# Patient Record
Sex: FEMALE | Race: WHITE
[De-identification: names, ages, dates, MRNs, and addresses within clinical notes are randomized per-mention and may not be internally consistent; named-entity substitution may affect disease eponyms.]

---

## 2019-11-27 ENCOUNTER — HOSPITAL ENCOUNTER (OUTPATIENT)
Dept: HOSPITAL 47 - LABPAT | Age: 69
Discharge: HOME | End: 2019-11-27
Attending: ORTHOPAEDIC SURGERY
Payer: MEDICARE

## 2019-11-27 DIAGNOSIS — Z79.01: ICD-10-CM

## 2019-11-27 DIAGNOSIS — Z01.812: Primary | ICD-10-CM

## 2019-11-27 LAB
ALBUMIN SERPL-MCNC: 3.8 G/DL (ref 3.5–5)
ALP SERPL-CCNC: 82 U/L (ref 38–126)
ALT SERPL-CCNC: 26 U/L (ref 9–52)
ANION GAP SERPL CALC-SCNC: 7 MMOL/L
APTT BLD: 26.6 SEC (ref 22–30)
AST SERPL-CCNC: 29 U/L (ref 14–36)
BUN SERPL-SCNC: 11 MG/DL (ref 7–17)
CALCIUM SPEC-MCNC: 9.9 MG/DL (ref 8.4–10.2)
CHLORIDE SERPL-SCNC: 105 MMOL/L (ref 98–107)
CO2 SERPL-SCNC: 25 MMOL/L (ref 22–30)
ERYTHROCYTE [DISTWIDTH] IN BLOOD BY AUTOMATED COUNT: 4.25 M/UL (ref 3.8–5.4)
ERYTHROCYTE [DISTWIDTH] IN BLOOD: 11.9 % (ref 11.5–15.5)
GLUCOSE SERPL-MCNC: 119 MG/DL (ref 74–99)
HCT VFR BLD AUTO: 38.5 % (ref 34–46)
HGB BLD-MCNC: 13.3 GM/DL (ref 11.4–16)
INR PPP: 1 (ref ?–1.2)
MCH RBC QN AUTO: 31.3 PG (ref 25–35)
MCHC RBC AUTO-ENTMCNC: 34.6 G/DL (ref 31–37)
MCV RBC AUTO: 90.4 FL (ref 80–100)
PH UR: 5.5 [PH] (ref 5–8)
PLATELET # BLD AUTO: 241 K/UL (ref 150–450)
POTASSIUM SERPL-SCNC: 4.3 MMOL/L (ref 3.5–5.1)
PROT SERPL-MCNC: 6.8 G/DL (ref 6.3–8.2)
PT BLD: 10.2 SEC (ref 9–12)
SODIUM SERPL-SCNC: 137 MMOL/L (ref 137–145)
SP GR UR: 1.01 (ref 1–1.03)
UROBILINOGEN UR QL STRIP: <2 MG/DL (ref ?–2)
WBC # BLD AUTO: 5.3 K/UL (ref 3.8–10.6)

## 2019-11-27 PROCEDURE — 85730 THROMBOPLASTIN TIME PARTIAL: CPT

## 2019-11-27 PROCEDURE — 87070 CULTURE OTHR SPECIMN AEROBIC: CPT

## 2019-11-27 PROCEDURE — 36415 COLL VENOUS BLD VENIPUNCTURE: CPT

## 2019-11-27 PROCEDURE — 85027 COMPLETE CBC AUTOMATED: CPT

## 2019-11-27 PROCEDURE — 80053 COMPREHEN METABOLIC PANEL: CPT

## 2019-11-27 PROCEDURE — 85610 PROTHROMBIN TIME: CPT

## 2019-11-27 PROCEDURE — 81003 URINALYSIS AUTO W/O SCOPE: CPT

## 2019-12-09 ENCOUNTER — HOSPITAL ENCOUNTER (OUTPATIENT)
Dept: HOSPITAL 47 - LABPAT | Age: 69
Discharge: HOME | End: 2019-12-09
Attending: ORTHOPAEDIC SURGERY
Payer: MEDICARE

## 2019-12-09 DIAGNOSIS — Z53.9: Primary | ICD-10-CM

## 2019-12-12 ENCOUNTER — HOSPITAL ENCOUNTER (INPATIENT)
Dept: HOSPITAL 47 - 2ORMAIN | Age: 69
LOS: 1 days | Discharge: HOME HEALTH | DRG: 470 | End: 2019-12-13
Attending: ORTHOPAEDIC SURGERY | Admitting: ORTHOPAEDIC SURGERY
Payer: MEDICARE

## 2019-12-12 VITALS — BODY MASS INDEX: 30.7 KG/M2

## 2019-12-12 DIAGNOSIS — E78.5: ICD-10-CM

## 2019-12-12 DIAGNOSIS — Z96.611: ICD-10-CM

## 2019-12-12 DIAGNOSIS — G47.9: ICD-10-CM

## 2019-12-12 DIAGNOSIS — M25.552: ICD-10-CM

## 2019-12-12 DIAGNOSIS — Z79.01: ICD-10-CM

## 2019-12-12 DIAGNOSIS — K21.9: ICD-10-CM

## 2019-12-12 DIAGNOSIS — I10: ICD-10-CM

## 2019-12-12 DIAGNOSIS — M25.542: ICD-10-CM

## 2019-12-12 DIAGNOSIS — Z88.5: ICD-10-CM

## 2019-12-12 DIAGNOSIS — Z79.890: ICD-10-CM

## 2019-12-12 DIAGNOSIS — M25.562: ICD-10-CM

## 2019-12-12 DIAGNOSIS — Z82.49: ICD-10-CM

## 2019-12-12 DIAGNOSIS — I48.0: ICD-10-CM

## 2019-12-12 DIAGNOSIS — R01.1: ICD-10-CM

## 2019-12-12 DIAGNOSIS — E03.9: ICD-10-CM

## 2019-12-12 DIAGNOSIS — M25.541: ICD-10-CM

## 2019-12-12 DIAGNOSIS — Z79.84: ICD-10-CM

## 2019-12-12 DIAGNOSIS — Z98.84: ICD-10-CM

## 2019-12-12 DIAGNOSIS — Z83.3: ICD-10-CM

## 2019-12-12 DIAGNOSIS — M25.561: ICD-10-CM

## 2019-12-12 DIAGNOSIS — M25.512: ICD-10-CM

## 2019-12-12 DIAGNOSIS — Z96.651: ICD-10-CM

## 2019-12-12 DIAGNOSIS — J45.909: ICD-10-CM

## 2019-12-12 DIAGNOSIS — M16.11: Primary | ICD-10-CM

## 2019-12-12 DIAGNOSIS — Z79.899: ICD-10-CM

## 2019-12-12 DIAGNOSIS — M25.511: ICD-10-CM

## 2019-12-12 DIAGNOSIS — Z87.11: ICD-10-CM

## 2019-12-12 DIAGNOSIS — E11.9: ICD-10-CM

## 2019-12-12 LAB
GLUCOSE BLD-MCNC: 171 MG/DL (ref 75–99)
GLUCOSE BLD-MCNC: 94 MG/DL (ref 75–99)

## 2019-12-12 PROCEDURE — 86901 BLOOD TYPING SEROLOGIC RH(D): CPT

## 2019-12-12 PROCEDURE — 88300 SURGICAL PATH GROSS: CPT

## 2019-12-12 PROCEDURE — 36415 COLL VENOUS BLD VENIPUNCTURE: CPT

## 2019-12-12 PROCEDURE — 0SR906A REPLACEMENT OF RIGHT HIP JOINT WITH OXIDIZED ZIRCONIUM ON POLYETHYLENE SYNTHETIC SUBSTITUTE, UNCEMENTED, OPEN APPROACH: ICD-10-PCS

## 2019-12-12 PROCEDURE — 85025 COMPLETE CBC W/AUTO DIFF WBC: CPT

## 2019-12-12 PROCEDURE — 86891 AUTOLOGOUS BLOOD OP SALVAGE: CPT

## 2019-12-12 PROCEDURE — 86900 BLOOD TYPING SEROLOGIC ABO: CPT

## 2019-12-12 PROCEDURE — 86850 RBC ANTIBODY SCREEN: CPT

## 2019-12-12 PROCEDURE — 73501 X-RAY EXAM HIP UNI 1 VIEW: CPT

## 2019-12-12 RX ADMIN — SODIUM CHLORIDE ONE MG: 9 INJECTION, SOLUTION INTRAVENOUS at 08:12

## 2019-12-12 RX ADMIN — SODIUM CHLORIDE ONE MG: 9 INJECTION, SOLUTION INTRAVENOUS at 07:34

## 2019-12-12 RX ADMIN — PROMETHAZINE HYDROCHLORIDE PRN MG: 25 TABLET ORAL at 16:02

## 2019-12-12 RX ADMIN — HYDROCODONE BITARTRATE AND ACETAMINOPHEN PRN EACH: 5; 325 TABLET ORAL at 21:17

## 2019-12-12 RX ADMIN — PROMETHAZINE HYDROCHLORIDE PRN MG: 25 TABLET ORAL at 21:17

## 2019-12-12 RX ADMIN — CEFAZOLIN SCH: 330 INJECTION, POWDER, FOR SOLUTION INTRAMUSCULAR; INTRAVENOUS at 13:44

## 2019-12-12 RX ADMIN — HYDROCODONE BITARTRATE AND ACETAMINOPHEN PRN EACH: 5; 325 TABLET ORAL at 16:02

## 2019-12-12 RX ADMIN — CEFAZOLIN SCH: 330 INJECTION, POWDER, FOR SOLUTION INTRAMUSCULAR; INTRAVENOUS at 21:39

## 2019-12-12 RX ADMIN — POTASSIUM CHLORIDE SCH MLS: 14.9 INJECTION, SOLUTION INTRAVENOUS at 06:20

## 2019-12-12 NOTE — XR
EXAMINATION TYPE: XR Hip Limited RT

 

DATE OF EXAM: 12/12/2019

 

COMPARISON: NONE

 

HISTORY: Postop

 

TECHNIQUE: One view submitted.

 

FINDINGS:

There is postsurgical change in near anatomic alignment.  There is soft tissue edema and emphysema. 

 

IMPRESSION:

1. Postoperative change.  Appears in near-anatomic alignment.

## 2019-12-12 NOTE — XR
Right hip

 

HISTORY: Status post right hip arthroplasty

 

Single frontal view of the right hip

 

Patient is status post right hip arthroplasty. There is anatomic alignment. Lucency present in the so
ft tissues compatible with postop state

 

IMPRESSION: Orthopedic follow-up

## 2019-12-12 NOTE — P.OP
Date of Procedure: 12/12/19


Preoperative Diagnosis: 


Severe osteoarthritis right hip


Postoperative Diagnosis: 


Severe osteoarthritis right hip


Procedure(s) Performed: 


Right total hip arthroplasty with a direct anterior approach


Implants: 


Smith and nephew Polarstem size 2 standard


Smith & Nephew R3, 3 hole acetabular shell, 48 mm


Smith & Nephew reflection 6.5 mm cancellus screw, 20 mm 2


Smith & Nephew R3, XLPE 20 acetabular liner


Smith & Nephew Oxinium femoral head 32 m, +0


All components were press-fit.


The articulation is Oxinium on polyethylene.


Anesthesia: spinal


Surgeon: Rodney Chamberlain


Assistant #1: Maryam Lema


Estimated Blood Loss (ml): 100


Pathology: other (Femoral head)


Condition: stable


Disposition: PACU


Indications for Procedure: 


After failure of conservative treatment we discussed the surgical and 

nonsurgical treatment options at length.  Patient wishes to proceed with a total

hip arthroplasty with a direct anterior approach.  Complications specific to 

this procedure were discussed at length, including but not limited to infection,

leg length discrepancy, dislocation, and nerve injury.  Patient is aware of all 

these complications and informed consent was obtained


Operative Findings: 


The operative findings are consistent with severe osteoarthritis of the right 

hip


Description of Procedure: 


Patient was seen and evaluated in the preoperative area, consent was reviewed, 

and the surgical site was marked with a skin marker.  Patient was then brought 

to the operating room and given prophylactic antibiotics intravenously.  1 g of 

Tranexamic acid was also given.  A spinal anesthetic was administered by the 

anesthesia department.   The patient was then placed on the Gibbon table with the 

bony prominences well-padded.  The hip area was then prepped and draped in usual

sterile fashion.





A universal timeout was then performed, which confirmed the patient's name, 

surgical site, ALLERGIES, and procedure being performed.  Next the incision site

was located at 1 cm distal and 1 cm lateral to the anterior superior iliac 

spine.  The skin and subcutaneous tissues were sharply incised.  Incision was 

carefully dissected down to the fascia overlying the tensor fascia ricci muscle. 

This fascia was then incised in line with the incision.  Next, using blunt 

finger dissection, the tensor fascia ricci muscle was dissected off its investing

fascia.  The muscle was then carefully retracted laterally with a cobra 

retractor over the lateral neck of the femur.  Next, the circumflex vessels were

identified and cauterized using the AquaMantis device.  The anterior hip capsule

was then exposed.  The capsule was then opened and an inverted T fashion.  Cobra

retractors were then placed intracapsularly.  The proximal femur was then 

visualized.





The femoral neck was then osteotomized appropriate level above the lesser 

trochanter.  Small amount of traction was placed with the Gibbon table.  A small 

wedge of bone was then removed from the remaining femoral head.  Next, using a 

corkscrew femoral head was easily removed from the acetabulum.  On gross visual 

inspection, the femoral head had complete loss of articular cartilage in 

multiple periarticular osteophytes.  Attention was then turned to the 

acetabulum.





the acetabulum was exposed and any remaining labrum was excised.  Sequential 

reaming of the acetabulum was performed using fluoroscopic guidance.  When the 

appropriate size was reached, a trial was then placed.  The position and fit of 

the trial was checked with fluoroscopy.  The trial was then removed.  Then, 

using fluoroscopic guidance, the final implant was impacted at 20 of 

anteversion and 40 of abduction, and fully seated in the acetabulum.  2 screws 

were then placed in the acetabulum.  Again fluoroscopy was used to check 

position of the screws.  Next, the liner was then impacted, with a 20 elevated 

liner located in the anterior superior quadrant.  Component locking was 

confirmed.





Attention was then directed to the femur.  With the aid of the Gibbon table, the 

femur was externally rotated to approximately 130, extended, and abducted under

the opposite leg.  A side hook was then placed under the proximal femur, and the

side hook elevator was used to elevate the proximal femur.  Retractors were then

placed.  A capsular release was performed, as well as a release of the conjoined

tendon, which afforded excellent visualization of the proximal femur.  Next, a 

box osteotome was used to lateralize the proximal femur.  A  was then

used to locate the femoral canal.  Sequential broaching was then performed with 

appropriate size which afforded excellent fixation in the proximal femur.  A 

trial was then placed with appropriate head and neck, and the hip was gently 

reduced with the aid of the Gibbon table.  Fluoroscopy was then used to check 

position of the components, as well as to ensure equal leg lengths.  The hip was

then gently dislocated and the trials were then removed.  Final implants were 

then impacted and the hip was again reduced.  Final fluoroscopic x-rays 

confirmed that the components were in anatomic position, as well as equal leg 

lengths.  The hip was also taken through range of motion, and found to be 

stable.





The hip was then copiously irrigated with antibiotic solution with pulsatile 

lavage.  The hip was then irrigated with Irrisept solution.  The soft tissues 

were then injected with a ropivacaine solution, which consisted of 246.25 mg of 

ropivacaine, 0.5 mg of epinephrine, 30 mg of Toradol, 80 g of clonidine, and 

48.45 mL of sterile water, for a total of 100 mL of fluid injected.  A second 

dose of 1 g of Tranexamic acid was also given.





the fascia was then closed with 2-0 strata fix suture.  The subcutaneous tissue 

was closed with 3-0 Vicryl.  The subcuticular tissue was closed with 3-0 strata 

fix suture.  The skin was then closed with Dermabond glue and a sterile silver 

dressing.  The patient was then transferred to the recovery room in stable 

condition.





The assistant  AYLA Robles was required due to the complexity of surgery, 

and the need for skilled surgical assistant for positioning, draping, exposure, 

retraction, and closure of the wound.
[No studies available for review at this time.] : No studies available for review at this time.

## 2019-12-12 NOTE — FL
EXAMINATION TYPE: FL guidance operating room

 

DATE OF EXAM: 12/12/2019

 

HISTORY: Flouroscopy  time

 

40 seconds of fluoroscopy provided. 

 

IMPRESSION:

1. Fluoroscopy time.

## 2019-12-13 VITALS — DIASTOLIC BLOOD PRESSURE: 64 MMHG | TEMPERATURE: 97.8 F | HEART RATE: 60 BPM | SYSTOLIC BLOOD PRESSURE: 103 MMHG

## 2019-12-13 VITALS — RESPIRATION RATE: 16 BRPM

## 2019-12-13 LAB
BASOPHILS # BLD AUTO: 0 K/UL (ref 0–0.2)
BASOPHILS NFR BLD AUTO: 0 %
EOSINOPHIL # BLD AUTO: 0.1 K/UL (ref 0–0.7)
EOSINOPHIL NFR BLD AUTO: 1 %
ERYTHROCYTE [DISTWIDTH] IN BLOOD BY AUTOMATED COUNT: 3.61 M/UL (ref 3.8–5.4)
ERYTHROCYTE [DISTWIDTH] IN BLOOD: 12.2 % (ref 11.5–15.5)
GLUCOSE BLD-MCNC: 100 MG/DL (ref 75–99)
GLUCOSE BLD-MCNC: 126 MG/DL (ref 75–99)
HCT VFR BLD AUTO: 32.9 % (ref 34–46)
HGB BLD-MCNC: 11.2 GM/DL (ref 11.4–16)
LYMPHOCYTES # SPEC AUTO: 1.7 K/UL (ref 1–4.8)
LYMPHOCYTES NFR SPEC AUTO: 27 %
MCH RBC QN AUTO: 30.9 PG (ref 25–35)
MCHC RBC AUTO-ENTMCNC: 33.9 G/DL (ref 31–37)
MCV RBC AUTO: 91 FL (ref 80–100)
MONOCYTES # BLD AUTO: 0.4 K/UL (ref 0–1)
MONOCYTES NFR BLD AUTO: 7 %
NEUTROPHILS # BLD AUTO: 4.1 K/UL (ref 1.3–7.7)
NEUTROPHILS NFR BLD AUTO: 64 %
PLATELET # BLD AUTO: 197 K/UL (ref 150–450)
WBC # BLD AUTO: 6.4 K/UL (ref 3.8–10.6)

## 2019-12-13 RX ADMIN — HYDROCODONE BITARTRATE AND ACETAMINOPHEN PRN EACH: 5; 325 TABLET ORAL at 10:39

## 2019-12-13 RX ADMIN — HYDROCODONE BITARTRATE AND ACETAMINOPHEN PRN EACH: 5; 325 TABLET ORAL at 04:05

## 2019-12-13 RX ADMIN — PROMETHAZINE HYDROCHLORIDE PRN MG: 25 TABLET ORAL at 11:07

## 2019-12-13 RX ADMIN — CEFAZOLIN SCH: 330 INJECTION, POWDER, FOR SOLUTION INTRAMUSCULAR; INTRAVENOUS at 11:10

## 2019-12-13 RX ADMIN — POTASSIUM CHLORIDE SCH: 14.9 INJECTION, SOLUTION INTRAVENOUS at 05:22

## 2019-12-13 NOTE — P.DS
Providers


Date of admission: 


12/12/19 05:34





Expected date of discharge: 12/13/19


Attending physician: 


Rodney Chamberlain





Consults: 





                                        





12/12/19 07:00


Consult Physician Routine 


   Consulting Provider: Keri Rodriguez


   Consult Reason/Comments: medical management and anticoagulation


   Do you want consulting provider notified?: Yes











Primary care physician: 


Karl Retana








- Discharge Diagnosis(es)


(1) Osteoarthritis of right hip


Current Visit: Yes   Status: Acute   





(2) S/P total hip arthroplasty


Current Visit: Yes   Status: Acute   


Hospital Course: 


This is a 69-year-old female with known history of degenerative arthritis of the

right hip.  The patient presents for evaluation.  After discussion and 

consideration patient elects to proceed with total hip arthroplasty.  The 

patient is seen preoperatively by Dr. Chamberlain and medically cleared for surgery

by their primary care physician.





Patient is admitted to [facility] on 12/12/2019 for total hip 

arthroplasty.  The procedures performed without complication or sequelae.  The 

patient is doing well postoperatively.  Labs and vital signs are stable on day 

of discharge.





On day of discharge patient's hip incision is healing well.  There is minimal 

erythema.  There is no drainage noted at this time.  There is minimal soft 

tissue swelling to the hip and thigh.  Patient has full foot and ankle motion 

without difficulty or pain.  Calf is soft and nontender to palpation.  

Neurovascular status to the right lower extremity is intact.  Patient is 

discharged home in good condition. Opioid start talking form is reviewed and 

signed at patient bedside. Patient takes Xarelto routinely and will resume her 

normal dosing for postoperative anticoagulation. Please see med rec for accurate

list of home medications.








Plan - Discharge Summary


Discharge Rx Participant: Yes


New Discharge Prescriptions: 


New


   HYDROcodone/APAP 5-325MG [Norco 5-325] 1 - 2 tab PO Q6HR PRN #56 tab


     PRN Reason: Pain


   Promethazine [Phenergan] 12.5 mg PO Q6HR #28 tablet


   Sennosides [Senokot] 2 tab PO DAILY PRN #60 tablet


     PRN Reason: Constipation





No Action


   Zolpidem [Ambien] 10 mg PO HS PRN


     PRN Reason: Insomnia


   Montelukast [Singulair] 10 mg PO HS


   Metoprolol Succinate (ER) [Toprol Xl] 25 mg PO HS


   metFORMIN HCL ER [Glucophage Xr] 500 mg PO HS


   Rivaroxaban [Xarelto] 20 mg PO HS


   Esomeprazole Magnesium [NexIUM] 40 mg PO BID


   Celecoxib [CeleBREX] 200 mg PO DAILY


   Albuterol Nebulized [Ventolin Nebulized] 2.5 mg INHALATION Q6H PRN


     PRN Reason: Dyspnea


   Albuterol Inhaler [Ventolin Hfa Inhaler] 1 - 2 puff INHALATION RT-Q6H PRN


     PRN Reason: Dyspnea


   Losartan [Cozaar] 50 mg PO DAILY


   Levothyroxine Sodium [Synthroid] 112 mcg PO DAILY


   Rosuvastatin [Crestor] 10 mg PO HS


   Dexlansoprazole [Dexilant] 60 mg PO DAILY


Discharge Medication List





Albuterol Inhaler [Ventolin Hfa Inhaler] 1 - 2 puff INHALATION RT-Q6H PRN 

12/10/19 [History]


Albuterol Nebulized [Ventolin Nebulized] 2.5 mg INHALATION Q6H PRN 12/10/19 

[History]


Celecoxib [CeleBREX] 200 mg PO DAILY 12/10/19 [History]


Dexlansoprazole [Dexilant] 60 mg PO DAILY 12/10/19 [History]


Esomeprazole Magnesium [NexIUM] 40 mg PO BID 12/10/19 [History]


Levothyroxine Sodium [Synthroid] 112 mcg PO DAILY 12/10/19 [History]


Losartan [Cozaar] 50 mg PO DAILY 12/10/19 [History]


Metoprolol Succinate (ER) [Toprol Xl] 25 mg PO HS 12/10/19 [History]


Montelukast [Singulair] 10 mg PO HS 12/10/19 [History]


Rivaroxaban [Xarelto] 20 mg PO HS 12/10/19 [History]


Rosuvastatin [Crestor] 10 mg PO HS 12/10/19 [History]


Zolpidem [Ambien] 10 mg PO HS PRN 12/10/19 [History]


metFORMIN HCL ER [Glucophage Xr] 500 mg PO HS 12/10/19 [History]


HYDROcodone/APAP 5-325MG [Norco 5-325] 1 - 2 tab PO Q6HR PRN #56 tab 12/13/19 

[Rx]


Promethazine [Phenergan] 12.5 mg PO Q6HR #28 tablet 12/13/19 [Rx]


Sennosides [Senokot] 2 tab PO DAILY PRN #60 tablet 12/13/19 [Rx]








Follow up Appointment(s)/Referral(s): 


Rodney Chamberlain DO [Doctor of Osteopathic Medicine] - 2 Weeks


Ambulatory/Diagnostic Orders: 


Walker w/ Wheels [DME.AMB1] Time Frame: 3 Months, Location: None Selected


Activity/Diet/Wound Care/Special Instructions: 


Weightbearing as tolerated with walker.


Leave dressing intact.  Dressing may be removed by home care nurse or by patient

 in 10 days.


May shower with dressing on.


Continue Xarelto for postoperative anticoagulation.


Recommend use of compression stockings daily for at least 2 weeks during the day

 to help prevent swelling and blood clots. May remove at night before sleeping. 


Please follow-up with [facility] in 2 weeks and call with any 

questions or concerns, [phone].


Discharge Disposition: HOME WITH HOME HEALTH SERVICES

## 2022-05-26 ENCOUNTER — HOSPITAL ENCOUNTER (OUTPATIENT)
Dept: HOSPITAL 47 - LABPAT | Age: 72
Discharge: HOME | End: 2022-05-26
Attending: ORTHOPAEDIC SURGERY
Payer: MEDICARE

## 2022-05-26 DIAGNOSIS — Z01.812: Primary | ICD-10-CM

## 2022-05-26 LAB
ALBUMIN SERPL-MCNC: 4.1 G/DL (ref 3.8–4.9)
ALBUMIN/GLOB SERPL: 1.32 G/DL (ref 1.6–3.17)
ALP SERPL-CCNC: 95 U/L (ref 41–126)
ALT SERPL-CCNC: 34 U/L (ref 8–44)
ANION GAP SERPL CALC-SCNC: 9.8 MMOL/L (ref 10–18)
APTT BLD: 28.6 SEC (ref 22–30)
AST SERPL-CCNC: 30 U/L (ref 13–35)
BUN SERPL-SCNC: 12.6 MG/DL (ref 9–27)
BUN/CREAT SERPL: 15.75 RATIO (ref 12–20)
CALCIUM SPEC-MCNC: 10.1 MG/DL (ref 8.7–10.3)
CHLORIDE SERPL-SCNC: 104 MMOL/L (ref 96–109)
CO2 SERPL-SCNC: 22.2 MMOL/L (ref 20–27.5)
ERYTHROCYTE [DISTWIDTH] IN BLOOD BY AUTOMATED COUNT: 4.47 X 10*6/UL (ref 4.1–5.2)
ERYTHROCYTE [DISTWIDTH] IN BLOOD: 12.3 % (ref 11.5–14.5)
GLOBULIN SER CALC-MCNC: 3.1 G/DL (ref 1.6–3.3)
GLUCOSE SERPL-MCNC: 108 MG/DL (ref 70–110)
HCT VFR BLD AUTO: 41.4 % (ref 37.2–46.3)
HGB BLD-MCNC: 13.4 G/DL (ref 12–15)
INR PPP: 1.1 (ref ?–1.2)
MCH RBC QN AUTO: 30 PG (ref 27–32)
MCHC RBC AUTO-ENTMCNC: 32.4 G/DL (ref 32–37)
MCV RBC AUTO: 92.6 FL (ref 80–97)
NRBC BLD AUTO-RTO: 0 /100 WBCS (ref 0–0)
PLATELET # BLD AUTO: 226 X 10*3/UL (ref 140–440)
POTASSIUM SERPL-SCNC: 4.1 MMOL/L (ref 3.5–5.5)
PROT SERPL-MCNC: 7.2 G/DL (ref 6.2–8.2)
PT BLD: 11.5 SEC (ref 9–12)
SODIUM SERPL-SCNC: 136 MMOL/L (ref 135–145)
WBC # BLD AUTO: 10 X 10*3/UL (ref 4.5–10)

## 2022-05-26 PROCEDURE — 87070 CULTURE OTHR SPECIMN AEROBIC: CPT

## 2022-05-26 PROCEDURE — 85610 PROTHROMBIN TIME: CPT

## 2022-05-26 PROCEDURE — 80053 COMPREHEN METABOLIC PANEL: CPT

## 2022-05-26 PROCEDURE — 81001 URINALYSIS AUTO W/SCOPE: CPT

## 2022-05-26 PROCEDURE — 85027 COMPLETE CBC AUTOMATED: CPT

## 2022-05-26 PROCEDURE — 36415 COLL VENOUS BLD VENIPUNCTURE: CPT

## 2022-05-26 PROCEDURE — 85730 THROMBOPLASTIN TIME PARTIAL: CPT

## 2022-05-26 PROCEDURE — 93005 ELECTROCARDIOGRAM TRACING: CPT

## 2022-05-27 LAB
PH UR: 5 [PH] (ref 5–8)
SP GR UR: >1.035 (ref 1–1.03)
UROBILINOGEN UR QL: 1

## 2022-06-07 ENCOUNTER — HOSPITAL ENCOUNTER (OUTPATIENT)
Dept: HOSPITAL 47 - OR | Age: 72
LOS: 1 days | End: 2022-06-08
Attending: ORTHOPAEDIC SURGERY
Payer: COMMERCIAL

## 2022-06-07 DIAGNOSIS — Z82.49: ICD-10-CM

## 2022-06-07 DIAGNOSIS — Z98.890: ICD-10-CM

## 2022-06-07 DIAGNOSIS — I10: ICD-10-CM

## 2022-06-07 DIAGNOSIS — Z79.890: ICD-10-CM

## 2022-06-07 DIAGNOSIS — I48.0: ICD-10-CM

## 2022-06-07 DIAGNOSIS — E11.9: ICD-10-CM

## 2022-06-07 DIAGNOSIS — Z79.899: ICD-10-CM

## 2022-06-07 DIAGNOSIS — J45.909: ICD-10-CM

## 2022-06-07 DIAGNOSIS — Z83.3: ICD-10-CM

## 2022-06-07 DIAGNOSIS — Z88.5: ICD-10-CM

## 2022-06-07 DIAGNOSIS — Z96.653: ICD-10-CM

## 2022-06-07 DIAGNOSIS — Z79.01: ICD-10-CM

## 2022-06-07 DIAGNOSIS — K21.9: ICD-10-CM

## 2022-06-07 DIAGNOSIS — M16.12: Primary | ICD-10-CM

## 2022-06-07 DIAGNOSIS — E78.5: ICD-10-CM

## 2022-06-07 DIAGNOSIS — E03.9: ICD-10-CM

## 2022-06-07 DIAGNOSIS — Z98.84: ICD-10-CM

## 2022-06-07 DIAGNOSIS — Z80.9: ICD-10-CM

## 2022-06-07 DIAGNOSIS — K44.9: ICD-10-CM

## 2022-06-07 DIAGNOSIS — Z98.891: ICD-10-CM

## 2022-06-07 DIAGNOSIS — Z87.11: ICD-10-CM

## 2022-06-07 LAB
ANION GAP SERPL CALC-SCNC: 9 MMOL/L
BUN SERPL-SCNC: 10 MG/DL (ref 7–17)
CALCIUM SPEC-MCNC: 9.8 MG/DL (ref 8.4–10.2)
CELLS COUNTED: 100
CHLORIDE SERPL-SCNC: 107 MMOL/L (ref 98–107)
CO2 SERPL-SCNC: 24 MMOL/L (ref 22–30)
ERYTHROCYTE [DISTWIDTH] IN BLOOD BY AUTOMATED COUNT: 4.44 M/UL (ref 3.8–5.4)
ERYTHROCYTE [DISTWIDTH] IN BLOOD: 12.1 % (ref 11.5–15.5)
GLUCOSE BLD-MCNC: 104 MG/DL (ref 75–99)
GLUCOSE BLD-MCNC: 132 MG/DL (ref 75–99)
GLUCOSE BLD-MCNC: 184 MG/DL (ref 75–99)
GLUCOSE SERPL-MCNC: 158 MG/DL (ref 74–99)
HCT VFR BLD AUTO: 41.3 % (ref 34–46)
HGB BLD-MCNC: 13.2 GM/DL (ref 11.4–16)
LYMPHOCYTES # BLD MANUAL: 0.82 K/UL (ref 1–4.8)
MAGNESIUM SPEC-SCNC: 2 MG/DL (ref 1.6–2.3)
MCH RBC QN AUTO: 29.8 PG (ref 25–35)
MCHC RBC AUTO-ENTMCNC: 32 G/DL (ref 31–37)
MCV RBC AUTO: 93 FL (ref 80–100)
MONOCYTES # BLD MANUAL: 0.16 K/UL (ref 0–1)
NEUTROPHILS NFR BLD MANUAL: 88 %
NEUTS SEG # BLD MANUAL: 7.22 K/UL (ref 1.3–7.7)
PLATELET # BLD AUTO: 225 K/UL (ref 150–450)
POTASSIUM SERPL-SCNC: 4.6 MMOL/L (ref 3.5–5.1)
SODIUM SERPL-SCNC: 140 MMOL/L (ref 137–145)
WBC # BLD AUTO: 8.2 K/UL (ref 3.8–10.6)

## 2022-06-07 PROCEDURE — 27130 TOTAL HIP ARTHROPLASTY: CPT

## 2022-06-07 PROCEDURE — 86850 RBC ANTIBODY SCREEN: CPT

## 2022-06-07 PROCEDURE — 86901 BLOOD TYPING SEROLOGIC RH(D): CPT

## 2022-06-07 PROCEDURE — 85025 COMPLETE CBC W/AUTO DIFF WBC: CPT

## 2022-06-07 PROCEDURE — 97165 OT EVAL LOW COMPLEX 30 MIN: CPT

## 2022-06-07 PROCEDURE — 97535 SELF CARE MNGMENT TRAINING: CPT

## 2022-06-07 PROCEDURE — 86900 BLOOD TYPING SEROLOGIC ABO: CPT

## 2022-06-07 PROCEDURE — 88300 SURGICAL PATH GROSS: CPT

## 2022-06-07 PROCEDURE — 97161 PT EVAL LOW COMPLEX 20 MIN: CPT

## 2022-06-07 PROCEDURE — 83735 ASSAY OF MAGNESIUM: CPT

## 2022-06-07 PROCEDURE — 80048 BASIC METABOLIC PNL TOTAL CA: CPT

## 2022-06-07 PROCEDURE — 73501 X-RAY EXAM HIP UNI 1 VIEW: CPT

## 2022-06-07 RX ADMIN — HYDROMORPHONE HYDROCHLORIDE PRN MG: 1 INJECTION, SOLUTION INTRAMUSCULAR; INTRAVENOUS; SUBCUTANEOUS at 20:21

## 2022-06-07 RX ADMIN — THERA TABS SCH: TAB at 17:09

## 2022-06-07 RX ADMIN — HYDROMORPHONE HYDROCHLORIDE PRN MG: 1 INJECTION, SOLUTION INTRAMUSCULAR; INTRAVENOUS; SUBCUTANEOUS at 17:38

## 2022-06-07 RX ADMIN — BENZOCAINE AND MENTHOL PRN EACH: 15; 3.6 LOZENGE ORAL at 23:49

## 2022-06-07 RX ADMIN — VENLAFAXINE SCH MG: 25 TABLET ORAL at 20:16

## 2022-06-07 RX ADMIN — METOPROLOL SUCCINATE SCH MG: 25 TABLET, EXTENDED RELEASE ORAL at 20:15

## 2022-06-07 RX ADMIN — LOSARTAN POTASSIUM SCH MG: 50 TABLET, FILM COATED ORAL at 20:15

## 2022-06-07 RX ADMIN — POTASSIUM CHLORIDE SCH: 14.9 INJECTION, SOLUTION INTRAVENOUS at 17:09

## 2022-06-07 NOTE — P.OP
Date of Procedure: 06/07/22


Preoperative Diagnosis: 


Severe osteoarthritis left hip


Postoperative Diagnosis: 


Severe osteoarthritis left hip


Procedure(s) Performed: 


Left total hip arthroplasty with a direct anterior approach


Implants: 


Smith & Nephew Polarstem standard size 2


Smith & Nephew R3, 3 hole hemispherical acetabular shell, 48 mm


Smith & Nephew Reflection 6.5 mm cancellus screw, 20 mm 2


Smith & Nephew R3, XLPE 20 acetabular liner 


Smith & Nephew Oxinium femoral head 32 m, +0


All components were press-fit.


The articulation is Oxinium on polyethylene.


Anesthesia: spinal


Surgeon: Rodney Chamberlain


Assistant #1: Zain Ryan


Estimated Blood Loss (ml): 200


Pathology: other (Femoral head)


Condition: stable


Disposition: PACU


Indications for Procedure: 


After failure of conservative treatment we discussed the surgical and 

nonsurgical treatment options at length.  Patient wishes to proceed with a total

hip arthroplasty with a direct anterior approach.  Complications specific to 

this procedure were discussed at length, including but not limited to infection,

leg length discrepancy, dislocation, nerve injury, and fracture.  Covid-19 was 

also discussed at length with the patient, and they are aware of the current 

policies and procedures.  The patient was given the option of delaying surgery, 

but they elect to proceed knowing these risks.  Patient is aware of all these 

complications and informed consent was obtained


Operative Findings: 


The operative findings are consistent with severe osteoarthritis of the left hip


Description of Procedure: 


Patient was seen and evaluated in the preoperative area and the consent was 

reviewed.  The operative site was marked with a skin marker.  The patient was 

then brought to the operating room and given preoperative antibiotics int

ravenously.  1 g of Tranexamic acid was also given intravenously.  A spinal 

anesthetic was administered by the anesthesia department.   The patient was then

placed on the Lowell table with the bony prominences well-padded.  The hip area 

was then prepped with a ChloraPrep solution and draped in the usual sterile 

fashion.





A universal timeout was then performed, which confirmed the patient's name, melina

gical site, ALLERGIES, and procedure being performed on the consent.  Next the 

incision site was located at 1 cm distal and 2 cm lateral to the anterior 

superior iliac spine.  The skin and subcutaneous tissues were sharply incised.  

Incision was carefully dissected down to the fascia overlying the tensor fascia 

ricci muscle.  This fascia was then incised in line with the incision.  Care was 

taken to stay laterally in order to avoid injuring the lateral femoral cutaneous

nerve.  Next, using blunt finger dissection, the tensor fascia ricci muscle was 

dissected off its investing fascia.  The muscle was then carefully retracted 

laterally with a cobra retractor over the lateral neck of the femur.  Next, the 

circumflex vessels were identified and cauterized using the AquaMantis device.  

The anterior hip capsule was then exposed.  The capsule was then opened and an 

inverted T fashion.  Cobra retractors were then placed intracapsularly.  The 

retractors were maintained intracapsular throughout the procedure.  The proximal

femur was then visualized.  Fluoroscopic x-rays were then taken in order to 

evaluate the preoperative leg lengths.  A small amount of traction was placed on

the leg.





The femoral neck was then osteotomized at the appropriate level above the lesser

trochanter.  A small wedge of bone was then removed from the remaining femoral 

head.  Next, using a corkscrew the femoral head was removed from the acetabulum.

 On gross visual inspection, the femoral head had complete loss of articular 

cartilage and multiple periarticular osteophytes.  The femoral head was then 

measured.  Attention was then turned to the acetabulum.





The acetabulum was exposed and any remaining labrum was excised.  Sequential 

reaming of the acetabulum was performed using fluoroscopic guidance until there 

was a good bed of bleeding cancellus bone.  When the appropriate size was 

reached, a trial was then placed.  The position and fit of the trial was checked

with fluoroscopy.  The trial was then removed.  Then, using fluoroscopic 

guidance, the final implant was impacted at 20 of anteversion and 40 of 

abduction, and fully seated in the acetabulum.  2 screws were then placed in the

acetabulum.  Again fluoroscopy was used to check position of the screws.  Next, 

the liner was then impacted, with a 20 elevated liner located in the anterior 

superior quadrant.  Component locking was confirmed.





Attention was then directed to the femur.  With the aid of the Lowell table, the 

femur was externally rotated to approximately 130, extended, and adducted under

the opposite leg.  A side hook was then placed under the proximal femur, and the

side hook elevator was used to elevate the proximal femur while releasing the 

capsule.  Retractors were then placed.  A capsular release was performed, as 

well as a release of the conjoined tendon, which afforded excellent 

visualization of the proximal femur.  Next, a box osteotome was used to 

lateralize the proximal femur.  A  was then used to locate the 

femoral canal.  Sequential broaching was then performed with appropriate size 

which afforded excellent fixation in the proximal femur.  A trial was then 

placed with appropriate head and neck, and the hip was gently reduced with the 

aid of the Lowell table.  Fluoroscopy was then used to check position of the 

components, as well as to ensure equal leg lengths.  The hip was then gently 

dislocated and the trials were then removed.  Final implants were then impacted 

and the hip was again reduced.  Final fluoroscopic x-rays confirmed that the 

components were in anatomic position, as well as equal leg lengths.  The hip was

also taken through range of motion, and found to be stable.





The hip was then copiously irrigated with antibiotic solution with pulsatile 

lavage.  The hip was then irrigated with Irrisept solution.  The soft tissues 

were then injected with a ropivacaine solution.  A second dose of 1 g of 

Tranexamic acid was also given intravenously. 





The fascia was then closed with 2-0 strata fix suture.  The subcutaneous tissue 

was closed with 3-0 Vicryl.  The subcuticular tissue was closed with 3-0 strata 

fix suture.  The skin was then closed with Exofin skin glue.  After the glue and

dried, and Optifoam silver impregnated dressing was applied.  The patient was 

then transferred to the recovery room in stable condition.





The assistant  AYLA Gifford was required due to the complexity of surgery, 

and the need for skilled surgical assistant for positioning, draping, exposure, 

retraction, and closure of the wound.

## 2022-06-07 NOTE — FL
EXAMINATION TYPE: FL guidance operating room

 

DATE OF EXAM: 6/7/2022

 

HISTORY: Fluoroscopy  time

 

47 seconds of fluoroscopy provided. 

 

IMPRESSION:

1. Fluoroscopy time.

## 2022-06-07 NOTE — XR
EXAMINATION TYPE: XR Hip Limited LT

 

DATE OF EXAM: 6/7/2022

 

COMPARISON: NONE

 

HISTORY: Postop

 

TECHNIQUE: One view submitted.

 

FINDINGS:

There is postsurgical change in near anatomic alignment.  There is soft tissue edema and emphysema. 

 

IMPRESSION:

1. Postoperative change.  Appears in near-anatomic alignment.

## 2022-06-07 NOTE — P.CONS
History of Present Illness





- Reason for Consult


Consult date: 22





- Chief Complaint


Medical management





- History of Present Illness





72-year-old woman with medical history of hypertension, hyperlipidemia, diabetes

type 2, hypothyroidism, paroxysmal atrial fibrillation presented for elective 

left total hip arthroplasty.  Medicine consulted for medical management.  

Patient has no medical complaints at this time, only complaint is very mild left

hip pain.  She denies fevers, chills, nausea, vomiting, chest pain, 

palpitations, syncope, presyncope, cough, dyspnea, abdominal pain, constipation,

diarrhea, dysuria, dyschezia, numbness/weakness of extremities.





Upon my evaluation, patient's afebrile, 150/67, heart rate 65, 98% or better.  

No labs to review.  Hip x-rays demonstrate appropriate fixation status post left

hip total arthroplasty.





All Systems reviewed and pertinent positives and negatives noted in HPI, all 

other symptoms are negative





Gen: in no apparent distress, resting comfortably in bed


Eyes: PERRL, no scleral injection or icterus


HENT: normocephalic, atraumatic, good hearing acuity, moist mucous membranes


Neck: no tracheal deviation, full range of motion


Resp: good air exchange, breathing comfortably with no accessory muscle use, no 

tactile fremitus


CVS: good distal perfusion x 4, no pitting edema


GI: soft, NTTP, ND, no hepatosplenomegaly


: no suprapubic tenderness, no CVAT, cook catheter not present


MSK: no clubbing, no cyanosis, no noted contractures of extremities


Skin: no noted rashes, petechiae; temperature of skin is appropriate


Neuro: moving all extremities without signs of weakness, CN II-XII intact


Psych: cooperative, euthymic mood, insight and judgment intact





Labs and imaging as above





Assessment/plan:





Hypertension


Hyperlipidemia


Diabetes type 2


Hypothyroidism


Paroxysmal atrial fibrillation


Asthma


Hx of PUD


-Home medications reviewed and reconciled


-low dose SSI





Left hip osteoarthritis


Status post left hip total arthroplasty


-Timing of restart for Xarelto deferred to primary team


-Pain control per primary team/anesthesia





Patient is full code








Past Medical History


Past Medical History: Atrial Fibrillation, Asthma, Diabetes Mellitus, 

GERD/Reflux, Hyperlipidemia, Hypertension, Osteoarthritis (OA), Thyroid Disorder


Additional Past Medical History / Comment(s): hiatal hernia


History of Any Multi-Drug Resistant Organisms: None Reported


Past Surgical History: Back Surgery, Bariatric Surgery,  Section, Joint 

Replacement, Orthopedic Surgery


Additional Past Surgical History / Comment(s): gagan. knees replaced, gagan. elbow &

gagan. carpal tunnel, foot surg., stomach stapling back in 80's, TOTAL RIGHT HIP 

SURGERY,


Past Anesthesia/Blood Transfusion Reactions: No Reported Reaction, Family 

History of Problems w/ Anesthesia, Motion Sickness


Additional Past Anesthesia/Blood Transfusion Reaction / Comm: daughter had 

problems w/intubation


Past Psychological History: No Psychological Hx Reported


Smoking Status: Never smoker


Past Alcohol Use History: Occasional


Past Drug Use History: None Reported


Additional Drug Use History / Comment(s): topical cream only but just 

occasionally





- Past Family History


  ** Mother


Family Medical History: No Reported History





  ** Brother(s)


Family Medical History: Cancer





Medications and Allergies


                                Home Medications











 Medication  Instructions  Recorded  Confirmed  Type


 


Albuterol Inhaler (Mhu) [Ventolin 1 - 2 puff INHALATION RT-Q6H PRN 12/10/19 

06/03/22 History





Hfa Inhaler]    


 


Celecoxib [CeleBREX] 200 mg PO DAILY 12/10/19 06/03/22 History


 


Dexlansoprazole [Dexilant] 60 mg PO DAILY 12/10/19 06/03/22 History


 


Esomeprazole Magnesium [NexIUM] 40 mg PO HS 12/10/19 06/03/22 History


 


Levothyroxine Sodium [Synthroid] 112 mcg PO DAILY 12/10/19 06/03/22 History


 


Losartan [Cozaar] 50 mg PO BID 12/10/19 06/03/22 History


 


Metoprolol Succinate (ER) [Toprol 25 mg PO BID 12/10/19 06/03/22 History





Xl]    


 


Montelukast [Singulair] 10 mg PO HS 12/10/19 06/03/22 History


 


Rivaroxaban [Xarelto] 20 mg PO HS 12/10/19 06/03/22 History


 


Rosuvastatin [Crestor] 10 mg PO HS 12/10/19 06/03/22 History


 


Zolpidem [Ambien] 10 mg PO HS 12/10/19 06/03/22 History


 


metFORMIN HCL ER [Glucophage Xr] 500 mg PO HS 12/10/19 06/03/22 History


 


Venlafaxine HCl [Effexor] 25 mg PO BID 22 History








                                    Allergies











Allergy/AdvReac Type Severity Reaction Status Date / Time


 


morphine AdvReac  Nausea & Verified 22 11:33





   Vomiting  


 


pain medication AdvReac  Nausea & Uncoded 22 11:33





   Vomiting  














Physical Exam


Osteopathic Statement: *.  No significant issues noted on an osteopathic 

structural exam other than those noted in the History and Physical/Consult.


Vitals: 


                                   Vital Signs











  Temp Pulse Pulse Resp BP Pulse Ox


 


 22 15:33   65   16  150/67  98


 


 22 15:01   64   16  164/62  99


 


 22 14:46   51 L   16  159/71  100


 


 22 14:31   90   16  145/67  97


 


 22 14:16   56 L   16  152/71  99


 


 22 14:01   52 L   16  135/60  95


 


 22 13:46   55 L   16  114/55  97


 


 22 13:33  96.8 F L  59 L   12  101/51  98


 


 22 10:47  98 F   56 L  20  183/86  98








                                Intake and Output











 22





 06:59 14:59 22:59


 


Intake Total  1051 300


 


Output Total  200 


 


Balance  851 300


 


Intake:   


 


  IV  1051 300


 


Output:   


 


  Estimated Blood Loss  200 


 


Other:   


 


  Weight  92 kg 














Results


Labs: 


                  Abnormal Lab Results - Last 24 Hours (Table)











  22 Range/Units





  10:28 13:55 


 


POC Glucose (mg/dL)  104 H  132 H  (75-99)  mg/dL

## 2022-06-08 VITALS
HEART RATE: 61 BPM | RESPIRATION RATE: 17 BRPM | DIASTOLIC BLOOD PRESSURE: 71 MMHG | SYSTOLIC BLOOD PRESSURE: 132 MMHG | TEMPERATURE: 97.9 F

## 2022-06-08 LAB
ANION GAP SERPL CALC-SCNC: 7 MMOL/L
BASOPHILS # BLD AUTO: 0 K/UL (ref 0–0.2)
BASOPHILS NFR BLD AUTO: 0 %
BUN SERPL-SCNC: 10 MG/DL (ref 7–17)
CALCIUM SPEC-MCNC: 9.7 MG/DL (ref 8.4–10.2)
CHLORIDE SERPL-SCNC: 109 MMOL/L (ref 98–107)
CO2 SERPL-SCNC: 22 MMOL/L (ref 22–30)
EOSINOPHIL # BLD AUTO: 0 K/UL (ref 0–0.7)
EOSINOPHIL NFR BLD AUTO: 0 %
ERYTHROCYTE [DISTWIDTH] IN BLOOD BY AUTOMATED COUNT: 4.04 M/UL (ref 3.8–5.4)
ERYTHROCYTE [DISTWIDTH] IN BLOOD: 12.6 % (ref 11.5–15.5)
GLUCOSE BLD-MCNC: 115 MG/DL (ref 75–99)
GLUCOSE BLD-MCNC: 136 MG/DL (ref 75–99)
GLUCOSE SERPL-MCNC: 133 MG/DL (ref 74–99)
HCT VFR BLD AUTO: 37.1 % (ref 34–46)
HGB BLD-MCNC: 12.3 GM/DL (ref 11.4–16)
LYMPHOCYTES # SPEC AUTO: 1.5 K/UL (ref 1–4.8)
LYMPHOCYTES NFR SPEC AUTO: 17 %
MAGNESIUM SPEC-SCNC: 2 MG/DL (ref 1.6–2.3)
MCH RBC QN AUTO: 30.5 PG (ref 25–35)
MCHC RBC AUTO-ENTMCNC: 33.3 G/DL (ref 31–37)
MCV RBC AUTO: 91.8 FL (ref 80–100)
MONOCYTES # BLD AUTO: 0.5 K/UL (ref 0–1)
MONOCYTES NFR BLD AUTO: 6 %
NEUTROPHILS # BLD AUTO: 6.7 K/UL (ref 1.3–7.7)
NEUTROPHILS NFR BLD AUTO: 75 %
PLATELET # BLD AUTO: 227 K/UL (ref 150–450)
POTASSIUM SERPL-SCNC: 5 MMOL/L (ref 3.5–5.1)
SODIUM SERPL-SCNC: 138 MMOL/L (ref 137–145)
WBC # BLD AUTO: 9 K/UL (ref 3.8–10.6)

## 2022-06-08 RX ADMIN — LOSARTAN POTASSIUM SCH MG: 50 TABLET, FILM COATED ORAL at 07:29

## 2022-06-08 RX ADMIN — THERA TABS SCH EACH: TAB at 07:29

## 2022-06-08 RX ADMIN — METOPROLOL SUCCINATE SCH MG: 25 TABLET, EXTENDED RELEASE ORAL at 07:29

## 2022-06-08 RX ADMIN — HYDROMORPHONE HYDROCHLORIDE PRN MG: 1 INJECTION, SOLUTION INTRAMUSCULAR; INTRAVENOUS; SUBCUTANEOUS at 08:49

## 2022-06-08 RX ADMIN — POTASSIUM CHLORIDE SCH: 14.9 INJECTION, SOLUTION INTRAVENOUS at 09:52

## 2022-06-08 RX ADMIN — BENZOCAINE AND MENTHOL PRN EACH: 15; 3.6 LOZENGE ORAL at 05:08

## 2022-06-08 RX ADMIN — HYDROMORPHONE HYDROCHLORIDE PRN MG: 1 INJECTION, SOLUTION INTRAMUSCULAR; INTRAVENOUS; SUBCUTANEOUS at 02:58

## 2022-06-08 RX ADMIN — VENLAFAXINE SCH MG: 25 TABLET ORAL at 07:29

## 2022-06-08 NOTE — P.DS
Providers


Expected date of discharge: 06/08/22


Attending physician: 


Rodney Chamberlain





Consults: 





                                        





06/07/22 11:16


Consult Physician Routine 


   Consulting Provider: Melonie Pandya


   Consult Reason/Comments: post op medical management


   Do you want consulting provider notified?: Yes











Primary care physician: 


Inessa Doan MD








- Discharge Diagnosis(es)


(1) Primary localized osteoarthritis of left hip


Current Visit: Yes   Status: Acute   





(2) Status post total hip replacement, left


Current Visit: Yes   Status: Acute   


Hospital Course: 


This is a 72 -year-old Female with known history of degenerative arthritis of 

the Left hip.  The patient presents for evaluation.  After discussion and 

consideration patient elects to proceed with total  hip arthroplasty with direct

anterior approach.


The patient is seen preoperatively by primary care physician and cleared for 

surgery.





Patient is admitted to [facility] on 06/07/2022 for total hip 

arthroplasty with direct anterior approach.  The procedure is performed without 

complication or sequelae.  The patient is doing well postoperatively.  Labs and 

vital signs are stable on day of discharge.





On day of discharge patient's hip incision is healing well.  There is minimal 

erythema.  There is no drainage noted at this time.  There is minimal soft 

tissue swelling to the hip and thigh.  Patient has full foot and ankle motion 

without difficulty or pain.  Neurovascular status to the lower extremity is 

intact.





Patient is discharged to home in good condition.  Please see med rec for 

accurate list of home medications.





Patient Condition at Discharge: Good





Plan - Discharge Summary


Discharge Rx Participant: Yes


New Discharge Prescriptions: 


New


   Sennosides-Docusate Sodium [Senokot-S] 1 tab PO BID #60 tablet


   Ondansetron Odt [Zofran Odt] 4 mg PO Q8HR PRN #14 tab


     PRN Reason: Nausea


   HYDROcodone/APAP 7.5-325MG [Norco 7.5-325] 1 - 2 tab PO Q6HR PRN #32 tab


     PRN Reason: Pain





No Action


   Zolpidem [Ambien] 10 mg PO HS


   Montelukast [Singulair] 10 mg PO HS


   Metoprolol Succinate (ER) [Toprol Xl] 25 mg PO BID


   metFORMIN HCL ER [Glucophage Xr] 500 mg PO HS


   Rivaroxaban [Xarelto] 20 mg PO HS


   Esomeprazole Magnesium [NexIUM] 40 mg PO HS


   Celecoxib [CeleBREX] 200 mg PO DAILY


   Albuterol Inhaler (Mhu) [Ventolin Hfa Inhaler] 1 - 2 puff INHALATION RT-Q6H 

PRN


     PRN Reason: Dyspnea


   Losartan [Cozaar] 50 mg PO BID


   Levothyroxine Sodium [Synthroid] 112 mcg PO DAILY


   Rosuvastatin [Crestor] 10 mg PO HS


   Dexlansoprazole [Dexilant] 60 mg PO DAILY


   Venlafaxine HCl [Effexor] 25 mg PO BID


Discharge Medication List





Albuterol Inhaler (Mhu) [Ventolin Hfa Inhaler] 1 - 2 puff INHALATION RT-Q6H PRN 

12/10/19 [History]


Celecoxib [CeleBREX] 200 mg PO DAILY 12/10/19 [History]


Dexlansoprazole [Dexilant] 60 mg PO DAILY 12/10/19 [History]


Esomeprazole Magnesium [NexIUM] 40 mg PO HS 12/10/19 [History]


Levothyroxine Sodium [Synthroid] 112 mcg PO DAILY 12/10/19 [History]


Losartan [Cozaar] 50 mg PO BID 12/10/19 [History]


Metoprolol Succinate (ER) [Toprol Xl] 25 mg PO BID 12/10/19 [History]


Montelukast [Singulair] 10 mg PO HS 12/10/19 [History]


Rivaroxaban [Xarelto] 20 mg PO HS 12/10/19 [History]


Rosuvastatin [Crestor] 10 mg PO HS 12/10/19 [History]


Zolpidem [Ambien] 10 mg PO HS 12/10/19 [History]


metFORMIN HCL ER [Glucophage Xr] 500 mg PO HS 12/10/19 [History]


Venlafaxine HCl [Effexor] 25 mg PO BID 06/03/22 [History]


HYDROcodone/APAP 7.5-325MG [Norco 7.5-325] 1 - 2 tab PO Q6HR PRN #32 tab 

06/08/22 [Rx]


Ondansetron Odt [Zofran Odt] 4 mg PO Q8HR PRN #14 tab 06/08/22 [Rx]


Sennosides-Docusate Sodium [Senokot-S] 1 tab PO BID #60 tablet 06/08/22 [Rx]








Follow up Appointment(s)/Referral(s): 


Rodney Chamberlain DO [Doctor of Osteopathic Medicine] - 2 Weeks


Activity/Diet/Wound Care/Special Instructions: 


May bear wt as tolerated w walker.


Remove Optifoam dressing 1 week post op.

## 2022-06-08 NOTE — P.PN
Subjective


Progress Note Date: 06/08/22 (delayed charting see at 0945)


Patient is a 72-year-old woman with medical history of hypertension, 

hyperlipidemia, diabetes type 2, hypothyroidism, paroxysmal atrial fibrillation 

presented for elective left total hip arthroplasty. 





Patient seen and examined at bedside. No chest pain, SOB, nuasee. Doing well 

pain wise. Diabetes well controlled at baseline. States last A1C 6.1 


General: non toxic, no distress, appears at stated age


Derm: warm, dry


Head: atraumatic, normocephalic, symmetric


Eyes: EOMI, no lid lag, anicteric sclera


Mouth: no lip lesion, mucus membranes moist


Cardiovascular: S1S2 reg, no murmur, positive posterior tibial pulse bilateral, 


Lungs: Decreased bs bilateral, no rhonchi, no rales , no accessory muscle use


Ext: no gross muscle atrophy, no edema, no contractures


Neuro:  CN II-XI grossly intact, no focal neuro deficits


Psych: Alert, oriented, appropriate affect 





Assessment/plan:





Hypertension


Hyperlipidemia


Diabetes type 2


Hypothyroidism


Paroxysmal atrial fibrillation


Asthma


Hx of PUD


- home med recs addressed for discharge. 


- monitor blood sugar daily


- follow-up with PCP next week 





Left hip osteoarthritis





Thank you for allowing us to participate in the care of this pleasant patient.  

Do not hesitate to contact us with questions.  Someone can be reached from the 

[facility] hospitalist group all hours of the day at [phone] or via 

perfect serve.








Objective





- Vital Signs


Vital signs: 


                                   Vital Signs











Temp  97.9 F   06/08/22 07:31


 


Pulse  61   06/08/22 08:00


 


Resp  17   06/08/22 08:00


 


BP  132/71   06/08/22 07:31


 


Pulse Ox  97   06/08/22 07:31


 


FiO2      








                                 Intake & Output











 06/07/22 06/08/22 06/08/22





 18:59 06:59 18:59


 


Intake Total 1351  


 


Output Total 200  


 


Balance 1151  


 


Weight 92 kg  


 


Intake:   


 


  IV 1351  


 


Output:   


 


  Estimated Blood Loss 200  


 


Other:   


 


  Voiding Method  Toilet Toilet


 


  # Voids 1 5 


 


  # Bowel Movements  0 0














- Labs


CBC & Chem 7: 


                                 06/08/22 05:35





                                 06/08/22 05:35


Labs: 


                  Abnormal Lab Results - Last 24 Hours (Table)











  06/07/22 06/07/22 06/07/22 Range/Units





  16:01 16:11 21:04 


 


Lymphocytes # (Manual)   0.82 L   (1.0-4.8)  k/uL


 


Chloride     ()  mmol/L


 


Glucose  158 H    (74-99)  mg/dL


 


POC Glucose (mg/dL)    184 H  (75-99)  mg/dL














  06/08/22 06/08/22 06/08/22 Range/Units





  05:35 06:50 11:24 


 


Lymphocytes # (Manual)     (1.0-4.8)  k/uL


 


Chloride  109 H    ()  mmol/L


 


Glucose  133 H    (74-99)  mg/dL


 


POC Glucose (mg/dL)   136 H  115 H  (75-99)  mg/dL